# Patient Record
Sex: FEMALE | Race: OTHER
[De-identification: names, ages, dates, MRNs, and addresses within clinical notes are randomized per-mention and may not be internally consistent; named-entity substitution may affect disease eponyms.]

---

## 2019-02-14 ENCOUNTER — HOSPITAL ENCOUNTER (EMERGENCY)
Dept: HOSPITAL 62 - ER | Age: 24
Discharge: HOME | End: 2019-02-14
Payer: SELF-PAY

## 2019-02-14 VITALS — DIASTOLIC BLOOD PRESSURE: 71 MMHG | SYSTOLIC BLOOD PRESSURE: 116 MMHG

## 2019-02-14 DIAGNOSIS — Z91.81: ICD-10-CM

## 2019-02-14 DIAGNOSIS — R19.09: Primary | ICD-10-CM

## 2019-02-14 DIAGNOSIS — M25.552: ICD-10-CM

## 2019-02-14 PROCEDURE — 99283 EMERGENCY DEPT VISIT LOW MDM: CPT

## 2019-02-14 PROCEDURE — 73502 X-RAY EXAM HIP UNI 2-3 VIEWS: CPT

## 2019-02-14 PROCEDURE — S0119 ONDANSETRON 4 MG: HCPCS

## 2019-02-14 NOTE — ER DOCUMENT REPORT
ED General





- General


Chief Complaint: Abscess


Stated Complaint: HIP PAIN


Time Seen by Provider: 02/14/19 20:11


Notes: 





Patient is a 23-year-old female that comes to the emergency department for chief

complaint of a painful area over her left hip/pelvis.  She states there is a 

hardened area that feels like a growth that has been there noticeably since last

October, she states on Halloween she went to an emergency department in [place] 

[place], had a CAT scan and an MRI, was told that she "might have cancer" and 

she has an orthopedic follow-up scheduled next month in [place].  She is 

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  She states that she came in

because the pain is severe frequently and she is having trouble sleeping because

of it.  She denies weight loss, fever or chills, other locations of pain.  She 

takes no daily medications except she was prescribed tramadol and she takes 

ibuprofen.  Only past surgical history reported is appendectomy.


TRAVEL OUTSIDE OF THE [place] IN LAST 30 DAYS: No





- Related Data


Allergies/Adverse Reactions: 


                                        





No Known Allergies Allergy (Verified 02/14/19 19:09)


   











Past Medical History





- General


Information source: Patient





- Social History


Smoking Status: Never Smoker


Frequency of alcohol use: Occasional


Drug Abuse: None


Lives with: Family


Family History: Reviewed & Not Pertinent





- Medical History


Medical History: Negative


Past Surgical History: Reports: Hx Appendectomy





- Immunizations


Immunizations up to date: Yes


Hx Diphtheria, Pertussis, Tetanus Vaccination: Yes





Review of Systems





- Review of Systems


Constitutional: No symptoms reported


EENT: No symptoms reported


Cardiovascular: No symptoms reported


Respiratory: No symptoms reported


Gastrointestinal: No symptoms reported


Genitourinary: No symptoms reported


Female Genitourinary: No symptoms reported


Musculoskeletal: See HPI


Skin: No symptoms reported


Hematologic/Lymphatic: No symptoms reported


Neurological/Psychological: No symptoms reported





Physical Exam





- Vital signs


Vitals: 


                                        











Temp Pulse Resp BP Pulse Ox


 


 98.3 F   109 H  17   130/82 H  100 


 


 02/14/19 19:13  02/14/19 19:13  02/14/19 19:13  02/14/19 19:13  02/14/19 19:13














- Notes


Notes: 





GENERAL: Alert, interacts well. No acute distress.


HEAD: Normocephalic, atraumatic.


EYES: Pupils equal, round, and reactive to light. Extraocular movements intact.


ENT: Oral mucosa moist, tongue midline. Oropharynx unremarkable. Airway patent. 

Nares patent, no nasal septal hematoma, TM's intact.


NECK: Full range of motion. Supple. Trachea midline.


LUNGS: Clear to auscultation bilaterally, no wheezes, rales, or rhonchi. No 

respiratory distress.


HEART: Regular rate and rhythm. No murmur


ABDOMEN: Soft, non-tender. Non-distended. Bowel sounds present in all 4 

quadrants.  There is an area over the left pelvis which appears to have a bony 

area that is abnormally large, there is no overt tenderness over the area but 

patient states that the area is where she will frequently have pain.


GENITOURINARY: Deferred


EXTREMITIES: Moves all 4 extremities spontaneously. No edema, normal radial and 

dorsalis pedis pulses bilaterally. No cyanosis.


BACK: no cervical, thoracic, lumbar midline tenderness. No saddle anesthesia, 

normal distal neurovascular exam. 


NEUROLOGICAL: Alert and oriented x3. Normal speech. [cranial nerves II through 

XII grossly intact]. 


PSYCH: Normal affect, normal mood.


SKIN: Warm, dry, normal turgor. No rashes or lesions noted.





Course





- Re-evaluation


Re-evalutation: 


On patient's exam the area she is concerned about appears to be bony in nature. 

Abdomen is benign, neurovascular exam is unremarkable, vital signs unremarkable.

 Patient afebrile, not losing weight, well-appearing otherwise.





Hip exam unremarkable on x-ray, pelvis shows bony growth on the left side 

consistent with patient's location of discomfort.  I discussed with patient, 

years ago she did have a fall on the same side, this probably initiated the 

growth.  She already has orthopedic follow-up.  She states her biggest problem 

is she cannot sleep because she cannot get comfortable.  Provided with Flexeril 

as a muscle relaxer for this.  Discussed expectations, follow-up, and return 

precautions.  Patient states understanding and agreement.  Stable at time of 

discharge.





- Vital Signs


Vital signs: 


                                        











Temp Pulse Resp BP Pulse Ox


 


 98.7 F   81   18   116/71   100 


 


 02/14/19 22:08  02/14/19 22:08  02/14/19 22:08  02/14/19 22:08  02/14/19 22:08














Discharge





- Discharge


Clinical Impression: 


 Left leg pain, Bony growth





Condition: Stable


Disposition: HOME, SELF-CARE


Additional Instructions: 


There is an abnormal bony growth over the left pelvis.  This probably is 

abnormal growth caused by an old injury.  Because it is causing you symptoms I 

highly recommend you perform close orthopedic follow-up for management of this. 

You can continue anti-inflammatory medications, ice to the area, and take the 

provided muscle relaxer at night to help you sleep.





Return for any concerning symptoms including severe swelling, fever, or any 

other concerning or worsening symptoms.


Prescriptions: 


Cyclobenzaprine HCl [Flexeril 5 mg Tablet] 1 - 2 tab PO TID PRN #30 tablet


 PRN Reason:

## 2019-02-14 NOTE — RADIOLOGY REPORT (SQ)
EXAM DESCRIPTION: 



XR HIP 2 OR MORE VIEWS



COMPLETED DATE/TME:  02/14/2019 20:23



CLINICAL HISTORY: 



23 years, Female, hip pain; hard abnormal swelling over

hip/pelvis



Findings: Bony alignment is anatomic. No fracture or dislocation.

Left hip iliac bone probable heterotopic ossification from prior

injury.



IMPRESSION:



No acute fracture.

## 2019-03-14 ENCOUNTER — HOSPITAL ENCOUNTER (EMERGENCY)
Dept: HOSPITAL 62 - ER | Age: 24
Discharge: LEFT BEFORE DISCHARGE | End: 2019-03-14
Payer: SELF-PAY

## 2019-03-14 ENCOUNTER — HOSPITAL ENCOUNTER (EMERGENCY)
Dept: HOSPITAL 62 - ER | Age: 24
LOS: 1 days | Discharge: LEFT BEFORE DISCHARGE | End: 2019-03-15
Payer: SELF-PAY

## 2019-03-14 VITALS — SYSTOLIC BLOOD PRESSURE: 100 MMHG | DIASTOLIC BLOOD PRESSURE: 70 MMHG

## 2019-03-14 VITALS — SYSTOLIC BLOOD PRESSURE: 111 MMHG | DIASTOLIC BLOOD PRESSURE: 72 MMHG

## 2019-03-14 DIAGNOSIS — Z53.21: Primary | ICD-10-CM

## 2019-03-14 PROCEDURE — 93976 VASCULAR STUDY: CPT

## 2019-03-14 PROCEDURE — 81001 URINALYSIS AUTO W/SCOPE: CPT

## 2019-03-14 PROCEDURE — 80053 COMPREHEN METABOLIC PANEL: CPT

## 2019-03-14 PROCEDURE — 83690 ASSAY OF LIPASE: CPT

## 2019-03-14 PROCEDURE — 85025 COMPLETE CBC W/AUTO DIFF WBC: CPT

## 2019-03-14 PROCEDURE — 84702 CHORIONIC GONADOTROPIN TEST: CPT

## 2019-03-14 PROCEDURE — 36415 COLL VENOUS BLD VENIPUNCTURE: CPT

## 2019-03-14 PROCEDURE — 76817 TRANSVAGINAL US OBSTETRIC: CPT

## 2019-03-15 LAB
ADD MANUAL DIFF: NO
ALBUMIN SERPL-MCNC: 4.7 G/DL (ref 3.5–5)
ALP SERPL-CCNC: 54 U/L (ref 38–126)
ALT SERPL-CCNC: 37 U/L (ref 9–52)
ANION GAP SERPL CALC-SCNC: 10 MMOL/L (ref 5–19)
APPEARANCE UR: (no result)
APTT PPP: (no result) S
AST SERPL-CCNC: 28 U/L (ref 14–36)
BASOPHILS # BLD AUTO: 0 10^3/UL (ref 0–0.2)
BASOPHILS NFR BLD AUTO: 0.5 % (ref 0–2)
BILIRUB DIRECT SERPL-MCNC: 0.1 MG/DL (ref 0–0.4)
BILIRUB SERPL-MCNC: 0.4 MG/DL (ref 0.2–1.3)
BILIRUB UR QL STRIP: NEGATIVE
BUN SERPL-MCNC: 9 MG/DL (ref 7–20)
CALCIUM: 9.8 MG/DL (ref 8.4–10.2)
CHLORIDE SERPL-SCNC: 106 MMOL/L (ref 98–107)
CO2 SERPL-SCNC: 23 MMOL/L (ref 22–30)
EOSINOPHIL # BLD AUTO: 0.1 10^3/UL (ref 0–0.6)
EOSINOPHIL NFR BLD AUTO: 1.3 % (ref 0–6)
ERYTHROCYTE [DISTWIDTH] IN BLOOD BY AUTOMATED COUNT: 13.7 % (ref 11.5–14)
GLUCOSE SERPL-MCNC: 87 MG/DL (ref 75–110)
GLUCOSE UR STRIP-MCNC: NEGATIVE MG/DL
HCT VFR BLD CALC: 40.1 % (ref 36–47)
HGB BLD-MCNC: 13.5 G/DL (ref 12–15.5)
KETONES UR STRIP-MCNC: (no result) MG/DL
LIPASE SERPL-CCNC: 101.7 U/L (ref 23–300)
LYMPHOCYTES # BLD AUTO: 2 10^3/UL (ref 0.5–4.7)
LYMPHOCYTES NFR BLD AUTO: 32.1 % (ref 13–45)
MCH RBC QN AUTO: 30 PG (ref 27–33.4)
MCHC RBC AUTO-ENTMCNC: 33.7 G/DL (ref 32–36)
MCV RBC AUTO: 89 FL (ref 80–97)
MONOCYTES # BLD AUTO: 0.5 10^3/UL (ref 0.1–1.4)
MONOCYTES NFR BLD AUTO: 8 % (ref 3–13)
NEUTROPHILS # BLD AUTO: 3.6 10^3/UL (ref 1.7–8.2)
NEUTS SEG NFR BLD AUTO: 58.1 % (ref 42–78)
NITRITE UR QL STRIP: POSITIVE
PH UR STRIP: 7 [PH] (ref 5–9)
PLATELET # BLD: 129 10^3/UL (ref 150–450)
POTASSIUM SERPL-SCNC: 3.7 MMOL/L (ref 3.6–5)
PROT SERPL-MCNC: 7.5 G/DL (ref 6.3–8.2)
PROT UR STRIP-MCNC: NEGATIVE MG/DL
RBC # BLD AUTO: 4.5 10^6/UL (ref 3.72–5.28)
SODIUM SERPL-SCNC: 139.1 MMOL/L (ref 137–145)
SP GR UR STRIP: 1.02
TOTAL CELLS COUNTED % (AUTO): 100 %
UROBILINOGEN UR-MCNC: 2 MG/DL (ref ?–2)
WBC # BLD AUTO: 6.2 10^3/UL (ref 4–10.5)

## 2019-03-15 NOTE — RADIOLOGY REPORT (SQ)
EXAM DESCRIPTION: 



US PREGNANCY TRANSVAGINAL



COMPLETED DATE/TME:  03/15/2019 01:28



CLINICAL HISTORY: 



23 years, Female, preg, pain



COMPARISON:

None.



TECHNIQUE:

Transvaginal images of the pelvis were obtained



LIMITATIONS:

None.



FINDINGS:



Uterus measures 8.5 x 4.8 x 4.2 cm. An intrauterine gestational

sac is identified that measures approximately 1.0 cm in size. No

fetal pole or yolk sac is identified. A 0.3 x 0.2 cm hypoechoic

area is seen adjacent to the gestational sac, likely representing

a small subchorionic bleed.

The right ovary measures 4.2 x 4.0 x 3.8 cm. There is a complex

right ovarian cyst with internal echoes which measures 4.0 x 3.6

x 3.2 cm. There is no hypervascular flow within right ovary. The

left ovary measures 2.6 x 1.2 x 1.2 cm.

No free fluid is detected.





IMPRESSION:



Intrauterine gestational sac with no fetal pole or yolk sac

identified. This is likely due to an early pregnancy. Recommend

follow-up ultrasound and correlation with beta-hCG.

Complex right ovarian cyst with no areas of increased vascular

flow.

 



copyright 2011 [employer]- All Rights Reserved